# Patient Record
Sex: MALE | Race: WHITE | HISPANIC OR LATINO | Employment: UNEMPLOYED | URBAN - METROPOLITAN AREA
[De-identification: names, ages, dates, MRNs, and addresses within clinical notes are randomized per-mention and may not be internally consistent; named-entity substitution may affect disease eponyms.]

---

## 2024-09-21 ENCOUNTER — HOSPITAL ENCOUNTER (EMERGENCY)
Facility: HOSPITAL | Age: 34
Discharge: HOME | End: 2024-09-21
Attending: EMERGENCY MEDICINE
Payer: MEDICAID

## 2024-09-21 VITALS
OXYGEN SATURATION: 100 % | DIASTOLIC BLOOD PRESSURE: 68 MMHG | TEMPERATURE: 98 F | HEIGHT: 69 IN | RESPIRATION RATE: 16 BRPM | HEART RATE: 81 BPM | BODY MASS INDEX: 23.25 KG/M2 | WEIGHT: 157 LBS | SYSTOLIC BLOOD PRESSURE: 115 MMHG

## 2024-09-21 DIAGNOSIS — S81.812A LACERATION OF LEFT CALF: ICD-10-CM

## 2024-09-21 PROCEDURE — 96372 THER/PROPH/DIAG INJ SC/IM: CPT | Performed by: EMERGENCY MEDICINE

## 2024-09-21 PROCEDURE — 99284 EMERGENCY DEPT VISIT MOD MDM: CPT | Mod: 25

## 2024-09-21 PROCEDURE — 25000003 PHARM REV CODE 250: Performed by: EMERGENCY MEDICINE

## 2024-09-21 PROCEDURE — 63600175 PHARM REV CODE 636 W HCPCS: Performed by: EMERGENCY MEDICINE

## 2024-09-21 RX ORDER — BACITRACIN ZINC 500 UNIT/G
OINTMENT (GRAM) TOPICAL 2 TIMES DAILY
Qty: 30 G | Refills: 0 | Status: SHIPPED | OUTPATIENT
Start: 2024-09-21

## 2024-09-21 RX ORDER — KETOROLAC TROMETHAMINE 30 MG/ML
30 INJECTION, SOLUTION INTRAMUSCULAR; INTRAVENOUS
Status: COMPLETED | OUTPATIENT
Start: 2024-09-21 | End: 2024-09-21

## 2024-09-21 RX ORDER — CEPHALEXIN 500 MG/1
500 CAPSULE ORAL
Status: COMPLETED | OUTPATIENT
Start: 2024-09-21 | End: 2024-09-21

## 2024-09-21 RX ORDER — CEPHALEXIN 500 MG/1
500 CAPSULE ORAL 4 TIMES DAILY
Qty: 19 CAPSULE | Refills: 0 | Status: SHIPPED | OUTPATIENT
Start: 2024-09-21 | End: 2024-09-26

## 2024-09-21 RX ORDER — KETOROLAC TROMETHAMINE 10 MG/1
10 TABLET, FILM COATED ORAL EVERY 6 HOURS
Qty: 20 TABLET | Refills: 0 | Status: SHIPPED | OUTPATIENT
Start: 2024-09-21 | End: 2024-09-26

## 2024-09-21 RX ADMIN — CEPHALEXIN 500 MG: 500 CAPSULE ORAL at 09:09

## 2024-09-21 RX ADMIN — KETOROLAC TROMETHAMINE 30 MG: 30 INJECTION, SOLUTION INTRAMUSCULAR; INTRAVENOUS at 09:09

## 2024-09-21 NOTE — Clinical Note
"Asher Galvez" Cory Jolly was seen and treated in our emergency department on 9/21/2024.  He may return to work on 09/23/2024.       If you have any questions or concerns, please don't hesitate to call.      Niles Montanez Jr., MD"

## 2024-09-22 NOTE — DISCHARGE INSTRUCTIONS
¡Andrew por elegir el [facility]! Agradecemos que nos confíe pelletier atención médica.    Es importante recordar que algunos problemas son difíciles de diagnosticar y es posible que no se detecten zulema la primera visita. Asegúrese de hacer un seguimiento con pelletier médico de atención primaria y revisar cualquier análisis de laboratorio/imágenes que se haya realizado zulema pelletier visita con él. Si no tiene un médico de atención primaria, puede comunicarse con el que figura en pelletier documentación de jono, o también puede llamar al mostrador de citas de la [facility] al [phone] para programar juan carlos jameel.    Todos los medicamentos pueden tener efectos secundarios y es imposible predecir qué medicamentos pueden provocar efectos secundarios. Si siente que está teniendo un efecto negativo de algún medicamento, debe dejar de tomarlo inmediatamente y buscar atención médica. No conduzca ni tome decisiones importantes zulema 24 horas si ha recibido analgésicos, sedantes o medicamentos que alteran el estado de ánimo zulema pelletier visita a urgencias.    Estaremos encantados de atenderle para todas margie necesidades médicas futuras. Puede regresar a la zuri de emergencias en cualquier momento si presenta algún síntoma nuevo o preocupante, si pelletier condición empeora o si no mejora. Esperamos que te sientas mejor pronto.    Niles Montanez MD MPH  Medicina de emergencia

## 2024-09-22 NOTE — ED PROVIDER NOTES
Encounter Date: 9/21/2024       History     Chief Complaint   Patient presents with    Laceration     Fell on glass last night while playing soccer. Laceration left calf, bleeding controlled. Vaccines utd as of last month      Asher Jolly is a 34 y.o. male who  has no past medical history on file.    The patient presents to the ED due to laceration to his left calf.  This occurred yesterday approximately 28 hours ago while patient was playing soccer slipped and injured his left calf on a glass bottle on the field.  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  No medications therapies have been tried prior to arrival.  He denies any medical problems.  He states he is up-to-date on his vaccines.  No other concerns today.    A  was offered at no cost and declined.          Review of patient's allergies indicates:   Allergen Reactions    Penicillins      History reviewed. No pertinent past medical history.  History reviewed. No pertinent surgical history.  No family history on file.  Social History     Tobacco Use    Smoking status: Unknown     Review of Systems   Skin:  Positive for wound.   Neurological:  Negative for weakness and numbness.   All other systems reviewed and are negative.      Physical Exam     Initial Vitals [09/21/24 1935]   BP Pulse Resp Temp SpO2   115/68 81 16 98 °F (36.7 °C) 100 %      MAP       --         Physical Exam    Nursing note and vitals reviewed.  Constitutional: He appears well-developed and well-nourished. He is not diaphoretic. No distress.   HENT:   Head: Normocephalic and atraumatic.   Eyes: Conjunctivae are normal.   Cardiovascular:  Regular rhythm and intact distal pulses.           Abdominal: He exhibits no distension.   Musculoskeletal:      Comments: Left lower extremity: Sensation intact to light touch.  DP/PT pulses palpable.  2 cm laceration to mid posterior calf.   Bleeding controlled.  No surrounding warmth erythema drainage or fluctuance.  Diffuse tenderness to palpation around the wound.     Neurological: He is alert.   Skin: Skin is warm and dry. Capillary refill takes less than 2 seconds. No rash noted.   Psychiatric: He has a normal mood and affect.         ED Course   Procedures  Labs Reviewed - No data to display       Imaging Results              X-Ray Tibia Fibula 2 View Left (Final result)  Result time 09/21/24 20:31:31      Final result by Brian Spann DO (09/21/24 20:31:31)                   Impression:      No acute osseous abnormality.      Electronically signed by: Brian Spann  Date:    09/21/2024  Time:    20:31               Narrative:    EXAMINATION:  XR TIBIA FIBULA 2 VIEW LEFT    CLINICAL HISTORY:  Laceration without foreign body, left lower leg, initial encounter    TECHNIQUE:  AP and lateral views of the left tibia and fibula were performed.    COMPARISON:  None.    FINDINGS:  There is no acute fracture or dislocation of the tibia or fibula.  Alignment is normal.  Joint spaces are preserved.  There is no radiopaque foreign body or soft tissue gas.                                       Medications   ketorolac injection 30 mg (has no administration in time range)   cephALEXin capsule 500 mg (has no administration in time range)     Medical Decision Making  Patient with laceration to his extremity greater than 24 hours.  Given onset of injury repair with sutures is not indicated.  We will cleanse his wound and apply sterile dressing.    Amount and/or Complexity of Data Reviewed  Radiology:  Decision-making details documented in ED Course.    Risk  OTC drugs.  Prescription drug management.  Decision regarding hospitalization.  Diagnosis or treatment significantly limited by social determinants of health.  Risk Details: Patient's wound was irrigated cleansed.  Patient is neurovascularly intact.  Stable for discharge.  Prescription for Keflex provided.   Patient up-to-date on tetanus.  Discussed symptomatic treatment and expected course.  Return precautions discussed for worsening swelling drainage fevers or any other concerns.    After taking into careful account the historical factors and physical exam findings of the patient's presentation today, in conjunction with the empirical and objective data obtained on ED workup, no acute emergent medical condition has been identified. The patient appears to be low risk for an emergent medical condition and I feel it is safe and appropriate at this time for the patient to be discharged to follow-up as detailed in their discharge instructions for reevaluation and possible continued outpatient workup and management. I have discussed the specifics of the workup with the patient and the patient has verbalized understanding of the details of the workup, the diagnosis, the treatment plan, and the need for outpatient follow-up.  Although the patient has no emergent etiology today this does not preclude the development of an emergent condition so in addition, I have advised the patient that they can return to the ED and/or activate EMS at any time with worsening of their symptoms, change of their symptoms, or with any other medical complaint.  The patient remained comfortable and stable during their visit in the ED.  Discharge and follow-up instructions discussed with the patient who expressed understanding and willingness to comply with my recommendations.                 ED Course as of 09/21/24 2044   Sat Sep 21, 2024   2030 X-Ray Tibia Fibula 2 View Left  X-Ray Reviewed. Negative for acute fracture or dislocation by my independent interpretation, awaiting formal radiology read.   [RN]      ED Course User Index  [RN] Niles Montanez Jr., MD                           Clinical Impression:  Final diagnoses:  [S87.727S] Laceration of left calf         Portions of this note were dictated using voice recognition software and may  contain dictation related errors in spelling/grammar/syntax not found on text review          Niles Montanez Jr., MD  09/22/24 3691

## 2024-09-30 ENCOUNTER — HOSPITAL ENCOUNTER (EMERGENCY)
Facility: HOSPITAL | Age: 34
Discharge: HOME | End: 2024-09-30
Attending: STUDENT IN AN ORGANIZED HEALTH CARE EDUCATION/TRAINING PROGRAM
Payer: MEDICAID

## 2024-09-30 VITALS
RESPIRATION RATE: 16 BRPM | SYSTOLIC BLOOD PRESSURE: 118 MMHG | TEMPERATURE: 98 F | OXYGEN SATURATION: 100 % | BODY MASS INDEX: 25.92 KG/M2 | HEIGHT: 69 IN | WEIGHT: 175 LBS | HEART RATE: 65 BPM | DIASTOLIC BLOOD PRESSURE: 76 MMHG

## 2024-09-30 DIAGNOSIS — M79.662 PAIN OF LEFT CALF: ICD-10-CM

## 2024-09-30 DIAGNOSIS — S86.012A STRAIN OF ACHILLES TENDON, LEFT, INITIAL ENCOUNTER: Primary | ICD-10-CM

## 2024-09-30 DIAGNOSIS — S99.912A ANKLE INJURIES, LEFT, INITIAL ENCOUNTER: ICD-10-CM

## 2024-09-30 PROCEDURE — 25000003 PHARM REV CODE 250: Performed by: STUDENT IN AN ORGANIZED HEALTH CARE EDUCATION/TRAINING PROGRAM

## 2024-09-30 PROCEDURE — 99284 EMERGENCY DEPT VISIT MOD MDM: CPT | Mod: 25

## 2024-09-30 RX ORDER — NAPROXEN 500 MG/1
500 TABLET ORAL 2 TIMES DAILY WITH MEALS
Qty: 60 TABLET | Refills: 0 | Status: SHIPPED | OUTPATIENT
Start: 2024-09-30

## 2024-09-30 RX ORDER — NAPROXEN 500 MG/1
500 TABLET ORAL
Status: COMPLETED | OUTPATIENT
Start: 2024-09-30 | End: 2024-09-30

## 2024-09-30 RX ADMIN — NAPROXEN 500 MG: 500 TABLET ORAL at 10:09

## 2024-09-30 NOTE — ED NOTES
Pt reports injury to left leg last Friday while playing soccer. Pt states that he was going for the ball and slipped injuring his ankle and sustaining a laceration to the lateral calf. Pt was seen at the time but continues to have pain. Pt complaining of swelling to ankle and reports pain to tendon. Pt unable to point or flex left foot without significant pain.

## 2024-09-30 NOTE — ED PROVIDER NOTES
NAME:  Asher Jolly  CSN:     [id number]  MRN:    [id number]  ADMIT DATE: 9/30/2024        eMERGENCY dEPARTMENT eNCOUnter    CHIEF COMPLAINT    Chief Complaint   Patient presents with    Foot Injury     C/o L foot pain and lac to L lateral lower leg sustained from slip while playing soccer last Friday 9/20. Pt has contusion to L ankle.        HPI    Asher Jolly is a 34 y.o. male with a past medical history of  has no past medical history on file.     he presents to the ED due to pain to the left catheterization and left heel with associated ankle swelling.  States he played soccer Friday in describes what sounds like a slide tackle occurring while he was playing.  States he was able to walk it off and did not have any pain until the next day.  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  Believes that it made him worse and he could have re-injured it.  Notes that pain is most localized over the Achilles tendon and he was concerned he could have injured this.  Has been taking unknown anti-inflammatory medication.  Denies any other injuries.       utilized    HPI       PAST MEDICAL HISTORY  No past medical history on file.    SURGICAL HISTORY    No past surgical history on file.    FAMILY HISTORY    No family history on file.    SOCIAL HISTORY    Social History     Socioeconomic History    Marital status: Single   Tobacco Use    Smoking status: Unknown       MEDICATIONS  Current Outpatient Medications   Medication Instructions    bacitracin 500 unit/gram Oint Topical (Top), 2 times daily       ALLERGIES    Review of patient's allergies indicates:   Allergen Reactions    Penicillins          REVIEW OF SYSTEMS   Review of Systems       PHYSICAL EXAM    Reviewed Triage Note    VITAL SIGNS:   ED Triage Vitals [09/30/24 0953]   Encounter Vitals Group      /70      Systolic BP Percentile       Diastolic BP Percentile       Pulse 60      Resp  "18      Temp 98 °F (36.7 °C)      Temp Source Oral      SpO2 99 %      Weight 175 lb      Height 5' 8.9"      Head Circumference       Peak Flow       Pain Score       Pain Loc       Pain Education       Exclude from Growth Chart        Patient Vitals for the past 24 hrs:   BP Temp Temp src Pulse Resp SpO2 Height Weight   09/30/24 0953 114/70 98 °F (36.7 °C) Oral 60 18 99 % 5' 8.9" (1.75 m) 79.4 kg (175 lb)       Physical Exam    Nursing note and vitals reviewed.  Constitutional: He appears well-developed and well-nourished.   HENT:   Head: Normocephalic and atraumatic.   Eyes: EOM are normal. Pupils are equal, round, and reactive to light.   Neck: Neck supple.   Normal range of motion.  Cardiovascular:  Normal rate.           Pulmonary/Chest: No respiratory distress.   Musculoskeletal:      Cervical back: Normal range of motion and neck supple.      Comments: Significant tenderness to left calf with palpation.  Guarding with Harmon test and unable to definitively say whether it is positive or negative on the left at this time.  Is able to mildly dorsiflex and plantar flex the left ankle.  Significant swelling noted to the medial and lateral malleolus on the left with some mild ecchymosis.  Neurovascularly intact distally.  Able to walk with limp and partially bear weight.     Neurological: He is alert and oriented to person, place, and time.   Skin: Skin is warm and dry.   Superficial, linear abrasion to left lateral catheterization of his about 3 cm, well healing.  No surrounding erythema, warmth or drainage noted.   Psychiatric: He has a normal mood and affect.        EKG     Interpreted by EM physician if performed:               LABS  Pertinent labs reviewed. (See chart for details)   Labs Reviewed - No data to display      RADIOLOGY          Imaging Results              X-Ray Ankle Complete Left (In process)                       PROCEDURES    Procedures      ED COURSE & MEDICAL DECISION MAKING    Pertinent " Labs & Imaging studies reviewed. (See chart for details and specific orders.)          Summary of review of records:   Tdap May 28, 2024.    Medical Decision Making  Amount and/or Complexity of Data Reviewed  Radiology: ordered. Decision-making details documented in ED Course.    Risk  Prescription drug management.  Diagnosis or treatment significantly limited by social determinants of health.      Asher Jolly is a 34 y.o. male who presents with left calf and ankle pain after injury playing soccer 3 days ago.  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    Differential includes but is not limited to Achilles tendon rupture versus partial tear, ankle sprain, fracture, DVT, no evidence of cellulitis or underlying infection at this time.            Medications   naproxen tablet 500 mg (500 mg Oral Given 9/30/24 1050)       ED Course as of 10/08/24 1248   Mon Sep 30, 2024   1221 X-Ray Ankle Complete Left  1. No acute displaced fracture or dislocation of the ankle. [HL]   1221 US Lower Extremity Veins Left  No evidence of deep venous thrombosis in the left lower extremity.   [HL]      ED Course User Index  [HL] Janki Turner, DO       Point of care ultrasound of Achilles tendon without any obvious signs of rupture or tear.  Provide cam boot and instructions for care.  Referrals provided.  All questions addressed and reasons to return discussed.      FINAL IMPRESSION    Final diagnoses:  [S99.912A] Ankle injuries, left, initial encounter  [M79.662] Pain of left calf       DISPOSITION  Patient discharge in stable condition        ED Prescriptions       Medication Sig Dispense Start Date End Date Auth. Provider    naproxen (NAPROSYN) 500 MG tablet Take 1 tablet (500 mg total) by mouth 2 (two) times daily with meals. 60 tablet 9/30/2024 -- Janki Turner, DO          Follow-up Information       Follow up With Specialties Details Why Contact Joe Haskins DPM  Podiatry, Wound Care Schedule an appointment as soon as possible for a visit   [address] [facility]  [address]  [facility] [address]  [phone]      [facility] - Emergency Dept Emergency Medicine  As needed, If symptoms worsen [address] [facility] [id number] [phone]              DISCLAIMER: This note was prepared with [employer] voice recognition transcription software. Garbled syntax, mangled pronouns, and other bizarre constructions may be attributed to that software system.             Janki Turner, DO  10/08/24 1249

## 2024-09-30 NOTE — Clinical Note
"Asher "Asher" Cory Jolly was seen and treated in our emergency department on 9/30/2024.  He may return to work on 10/03/2024.       If you have any questions or concerns, please don't hesitate to call.      Janki Turner, DO"